# Patient Record
Sex: FEMALE | NOT HISPANIC OR LATINO | URBAN - METROPOLITAN AREA
[De-identification: names, ages, dates, MRNs, and addresses within clinical notes are randomized per-mention and may not be internally consistent; named-entity substitution may affect disease eponyms.]

---

## 2017-08-18 ENCOUNTER — IMPORTED ENCOUNTER (OUTPATIENT)
Dept: URBAN - METROPOLITAN AREA CLINIC 1 | Facility: CLINIC | Age: 62
End: 2017-08-18

## 2017-08-18 PROBLEM — H40.052: Noted: 2017-08-18

## 2017-08-18 PROBLEM — H16.143: Noted: 2017-08-18

## 2017-08-18 PROBLEM — H25.813: Noted: 2017-08-18

## 2017-08-18 PROBLEM — H04.123: Noted: 2017-08-18

## 2017-08-18 PROBLEM — H40.033: Noted: 2017-08-18

## 2017-08-18 PROCEDURE — 92012 INTRM OPH EXAM EST PATIENT: CPT

## 2017-08-18 NOTE — PATIENT DISCUSSION
1.  KRISTY w/ PEK OU- Increased symptoms despite using tears routinely. The increase of artificial tears OU to QID were recommended. Consider Restasis if no improvement. Placed Xsmall Silicone Plugs RLL and LLL:  Risks benefits and alternatives to placing plug was discussed with patient. Patient understands and would like to proceed. Consent was signed. Post op instructions were discussed/given to patient and patient was advised to call our office if any problems arose. 2.  Cataract OU: Observe for now without intervention. The patient was advised to contact us if any change or worsening of vision3. Narrow Angles OU- Patent Yag PI's OU4. OHTN OS (0.35)- Increased IOP OS today. Will re-eval in October w/ an OCT. Observe for changes/progression. Patient to continue with current treatment regimen. 5.   RTC as scheduled for a 30/OCT in October

## 2017-10-12 ENCOUNTER — IMPORTED ENCOUNTER (OUTPATIENT)
Dept: URBAN - METROPOLITAN AREA CLINIC 1 | Facility: CLINIC | Age: 62
End: 2017-10-12

## 2017-10-12 PROBLEM — H16.143: Noted: 2017-10-12

## 2017-10-12 PROBLEM — H25.813: Noted: 2017-10-12

## 2017-10-12 PROBLEM — H04.123: Noted: 2017-10-12

## 2017-10-12 PROBLEM — H40.033: Noted: 2017-10-12

## 2017-10-12 PROBLEM — H40.053: Noted: 2017-10-12

## 2017-10-12 PROCEDURE — 92133 CPTRZD OPH DX IMG PST SGM ON: CPT

## 2017-10-12 PROCEDURE — 92015 DETERMINE REFRACTIVE STATE: CPT

## 2017-10-12 PROCEDURE — 92014 COMPRE OPH EXAM EST PT 1/>: CPT

## 2018-04-12 ENCOUNTER — IMPORTED ENCOUNTER (OUTPATIENT)
Dept: URBAN - METROPOLITAN AREA CLINIC 1 | Facility: CLINIC | Age: 63
End: 2018-04-12

## 2018-04-12 PROBLEM — H04.123: Noted: 2018-04-12

## 2018-04-12 PROBLEM — H40.053: Noted: 2018-04-12

## 2018-04-12 PROBLEM — H16.143: Noted: 2018-04-12

## 2018-04-12 PROBLEM — H25.813: Noted: 2018-04-12

## 2018-04-12 PROBLEM — H40.033: Noted: 2018-04-12

## 2018-04-12 PROCEDURE — 99213 OFFICE O/P EST LOW 20 MIN: CPT

## 2018-04-12 NOTE — PATIENT DISCUSSION
1.  OHTN OU (0.3/0.45)- Stable IOP OU off drops. Observe for changes/progression. 2.  Cataract OU: Observe for now without intervention. The patient was advised to contact us if any change or worsening of vision3. KRISTY w/ PEK OU- Progressed OU but asymptomatic. Plugs out LLs OU. The continuation of artificial tears OU QID were recommended. Consider cautery LLs if no improvement. 4.  Narrow Angles OU- S/p PIs OU. 5. Return for an appointment for a 30/OCT in 6 months with Dr. Jeferson Banuelos.

## 2018-10-18 ENCOUNTER — IMPORTED ENCOUNTER (OUTPATIENT)
Dept: URBAN - METROPOLITAN AREA CLINIC 1 | Facility: CLINIC | Age: 63
End: 2018-10-18

## 2018-10-18 PROBLEM — H40.033: Noted: 2018-10-18

## 2018-10-18 PROBLEM — H25.813: Noted: 2018-10-18

## 2018-10-18 PROBLEM — H16.143: Noted: 2018-10-18

## 2018-10-18 PROBLEM — H04.123: Noted: 2018-10-18

## 2018-10-18 PROBLEM — H40.013: Noted: 2018-10-18

## 2018-10-18 PROBLEM — H40.053: Noted: 2018-10-18

## 2018-10-18 PROCEDURE — 92133 CPTRZD OPH DX IMG PST SGM ON: CPT

## 2018-10-18 PROCEDURE — 92014 COMPRE OPH EXAM EST PT 1/>: CPT

## 2018-10-18 PROCEDURE — 92015 DETERMINE REFRACTIVE STATE: CPT

## 2018-10-18 NOTE — PATIENT DISCUSSION
1.  OHTN (0.3/0.4)- Stable IOP OU. Normal OCT OU. Observe for changes/progression. Condition discussed w/ pt and reassurance given. 2.  KRISTY w/ PEK OU- improved. The continuation of artificial tears were recommended. H/o XS plugs fell out LLs OU. 3.  Cataract OU: Observe for now without intervention. The patient was advised to contact us if any change or worsening of vision4. Narrow Angles OU: S/p PIs OU. 5. Return for an appointment for 10/check K and IOP in 6 months with Dr. Carolee Bush.

## 2019-04-18 ENCOUNTER — IMPORTED ENCOUNTER (OUTPATIENT)
Dept: URBAN - METROPOLITAN AREA CLINIC 1 | Facility: CLINIC | Age: 64
End: 2019-04-18

## 2019-04-18 PROBLEM — H16.143: Noted: 2019-04-18

## 2019-04-18 PROBLEM — H04.123: Noted: 2019-04-18

## 2019-04-18 PROBLEM — H40.053: Noted: 2019-04-18

## 2019-04-18 PROCEDURE — 92012 INTRM OPH EXAM EST PATIENT: CPT

## 2019-04-18 NOTE — PATIENT DISCUSSION
1.  KRISTY w/ PEK OU-H/o Plugs LLs OU- plugs out RLL LLL. Cont ATs QID OU routinely. 2.  OHTN (0.3/0.4)- Stable IOP OU. Past w/u negative. Cont to observe on no gtts. **If IOP stable next visit will follow yearly. 3.  Cataract OU: Observe for now without intervention. The patient was advised to contact us if any change or worsening of vision4. Narrow Angles OU: S/p PIs OU. Return for an appointment in 6 mo 30 OCT with Dr. Jacklyn Blas.

## 2019-10-24 ENCOUNTER — IMPORTED ENCOUNTER (OUTPATIENT)
Dept: URBAN - METROPOLITAN AREA CLINIC 1 | Facility: CLINIC | Age: 64
End: 2019-10-24

## 2019-10-24 PROBLEM — H25.813: Noted: 2019-10-24

## 2019-10-24 PROBLEM — H40.053: Noted: 2019-10-24

## 2019-10-24 PROBLEM — H40.033: Noted: 2019-10-24

## 2019-10-24 PROBLEM — H04.123: Noted: 2019-10-24

## 2019-10-24 PROBLEM — H16.143: Noted: 2019-10-24

## 2019-10-24 PROCEDURE — 92015 DETERMINE REFRACTIVE STATE: CPT

## 2019-10-24 PROCEDURE — 92133 CPTRZD OPH DX IMG PST SGM ON: CPT

## 2019-10-24 PROCEDURE — 92014 COMPRE OPH EXAM EST PT 1/>: CPT

## 2019-10-24 NOTE — PATIENT DISCUSSION
1.  KRISTY w/ PEK OU- (H/o plugs LL OU puncta open OU) Cont ATs QID OU routinely. 2.  OHTN (CD 0.30/0.40) -Observe for changes/progression. Patient to continue with current treatment regimen. 3. Cataract OU: Observe for now without intervention. The patient was advised to contact us if any change or worsening of vision4. Narrow Angles OU - S/p PI's OU  MRX for glasses given. Return for an appointment in 1 year 30/glare with Dr. Robert Beard.

## 2020-10-15 ENCOUNTER — IMPORTED ENCOUNTER (OUTPATIENT)
Dept: URBAN - METROPOLITAN AREA CLINIC 1 | Facility: CLINIC | Age: 65
End: 2020-10-15

## 2020-10-15 PROBLEM — H25.813: Noted: 2020-10-15

## 2020-10-15 PROBLEM — H04.123: Noted: 2020-10-15

## 2020-10-15 PROBLEM — H16.143: Noted: 2020-10-15

## 2020-10-15 PROCEDURE — 92014 COMPRE OPH EXAM EST PT 1/>: CPT

## 2020-10-15 PROCEDURE — 92015 DETERMINE REFRACTIVE STATE: CPT

## 2020-10-15 NOTE — PATIENT DISCUSSION
1.  Cataract OU -- Observe for now without intervention. The patient was advised to contact us if any change or worsening of vision2. KRISTY w/ PEK OU -- (H/o plugs LL OU puncta open OU) Cont ATs QID OU routinely. 3.  OHTN (CD 0.30/0.40) -- IOP stable. Observe for changes/progression. Patient to continue with current treatment regimen. 4.  Narrow Angles OU -- S/p PI's OU. MRX for glasses given per patient request. Return for an appointment in 1 year 30/glare with Dr. Denise Galvez.

## 2021-10-14 ENCOUNTER — IMPORTED ENCOUNTER (OUTPATIENT)
Dept: URBAN - METROPOLITAN AREA CLINIC 1 | Facility: CLINIC | Age: 66
End: 2021-10-14

## 2021-10-14 PROBLEM — H43.813: Noted: 2021-10-14

## 2021-10-14 PROBLEM — H40.033: Noted: 2021-10-14

## 2021-10-14 PROBLEM — H16.143: Noted: 2021-10-14

## 2021-10-14 PROBLEM — H04.123: Noted: 2021-10-14

## 2021-10-14 PROBLEM — H25.813: Noted: 2021-10-14

## 2021-10-14 PROCEDURE — 92015 DETERMINE REFRACTIVE STATE: CPT

## 2021-10-14 PROCEDURE — 99214 OFFICE O/P EST MOD 30 MIN: CPT

## 2021-10-14 NOTE — PATIENT DISCUSSION
1.  Cataract OU -- Observe for now without intervention. The patient was advised to contact us if any change or worsening of vision2. KRISTY w/ PEK OU -- Increased PEK OU secondary to non compliance. Recommend the use of OTC ATs QID routinely. Compliance urged. 3.  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 4.  Narrow Angles OU -- Low Risk S/p PI's OU5. OHTN (CD 0.30/0.40) -- IOP stable. Observe no gtts. Return for an appointment in 1 year for a 30/glare with Dr. Siobhan Donnelly.

## 2022-04-02 ASSESSMENT — TONOMETRY
OS_IOP_MMHG: 20
OS_IOP_MMHG: 20
OD_IOP_MMHG: 20
OS_IOP_MMHG: 18
OS_IOP_MMHG: 18
OD_IOP_MMHG: 20
OS_IOP_MMHG: 20
OD_IOP_MMHG: 19
OD_IOP_MMHG: 20
OS_IOP_MMHG: 19
OD_IOP_MMHG: 18
OS_IOP_MMHG: 21
OD_IOP_MMHG: 17
OD_IOP_MMHG: 19
OD_IOP_MMHG: 19
OS_IOP_MMHG: 24

## 2022-04-02 ASSESSMENT — VISUAL ACUITY
OD_GLARE: 20/60
OD_GLARE: 20/40
OS_SC: 20/20
OD_CC: J1
OS_CC: J1
OD_SC: 20/20
OD_SC: 20/20
OS_CC: J1+
OD_SC: 20/20
OD_SC: 20/20
OD_CC: J1+-1
OD_SC: 20/20
OS_GLARE: 20/60
OS_SC: 20/25
OS_SC: 20/20
OD_GLARE: 20/50
OD_SC: 20/25+2
OS_SC: 20/20
OD_CC: J1+
OD_SC: 20/20
OS_SC: 20/20
OS_GLARE: 20/60
OS_GLARE: 20/50
OD_CC: J1+
OD_CC: J1
OD_SC: 20/25
OS_CC: J1
OD_CC: J1+
OD_GLARE: 20/60
OS_CC: J1+
OS_CC: J1+
OS_SC: 20/20
OS_GLARE: 20/40
OS_CC: J1+-1

## 2022-10-13 ENCOUNTER — COMPREHENSIVE EXAM (OUTPATIENT)
Dept: URBAN - METROPOLITAN AREA CLINIC 1 | Facility: CLINIC | Age: 67
End: 2022-10-13

## 2022-10-13 DIAGNOSIS — H25.813: ICD-10-CM

## 2022-10-13 DIAGNOSIS — H40.033: ICD-10-CM

## 2022-10-13 DIAGNOSIS — H16.143: ICD-10-CM

## 2022-10-13 DIAGNOSIS — H04.123: ICD-10-CM

## 2022-10-13 DIAGNOSIS — H43.813: ICD-10-CM

## 2022-10-13 PROCEDURE — 92015 DETERMINE REFRACTIVE STATE: CPT

## 2022-10-13 PROCEDURE — 92014 COMPRE OPH EXAM EST PT 1/>: CPT

## 2022-10-13 ASSESSMENT — TONOMETRY
OS_IOP_MMHG: 20
OD_IOP_MMHG: 20

## 2022-10-13 ASSESSMENT — VISUAL ACUITY
OS_BAT: 20/60
OD_CC: 20/25+2
OD_BAT: 20/60
OS_CC: 20/20-2
OU_CC: J1+

## 2022-10-13 NOTE — PATIENT DISCUSSION
1.  Cataract OU -- Observe for now without intervention. The patient was advised to contact us if any change or worsening of vision2. KRISTY w/ PEK OU -- Increased PEK OU secondary to non compliance. Recommend the use of OTC ATs QID routinely. Compliance urged. 3.  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 4.  Narrow Angles OU -- Low Risk S/p PI's OU5. OHTN (CD 0.30/0.40) -- IOP stable. Observe no gtts. Return for an appointment in 1 year for a 30/glare with Dr. Kavya Cervantes.

## 2022-10-13 NOTE — PATIENT DISCUSSION
Visually significant, however patient wishes to observe for now without intervention. The patient was advised to contact office with any change or worsening of vision. Glasses RX given today. Okay to call and schedule phaco pre-op H&P if patient desires.

## 2023-10-16 ENCOUNTER — COMPREHENSIVE EXAM (OUTPATIENT)
Dept: URBAN - METROPOLITAN AREA CLINIC 1 | Facility: CLINIC | Age: 68
End: 2023-10-16

## 2023-10-16 DIAGNOSIS — H43.813: ICD-10-CM

## 2023-10-16 DIAGNOSIS — H25.813: ICD-10-CM

## 2023-10-16 PROCEDURE — 92015 DETERMINE REFRACTIVE STATE: CPT

## 2023-10-16 PROCEDURE — 92014 COMPRE OPH EXAM EST PT 1/>: CPT

## 2023-10-16 ASSESSMENT — VISUAL ACUITY
OD_CC: 20/25
OS_CC: J1+
OD_CC: J1+
OS_BAT: 20/80
OD_BAT: 20/150
OS_CC: 20/25

## 2024-01-02 ENCOUNTER — PRE-OP/H&P (OUTPATIENT)
Dept: URBAN - METROPOLITAN AREA CLINIC 1 | Facility: CLINIC | Age: 69
End: 2024-01-02

## 2024-01-02 VITALS
DIASTOLIC BLOOD PRESSURE: 74 MMHG | BODY MASS INDEX: 23.78 KG/M2 | HEIGHT: 66 IN | SYSTOLIC BLOOD PRESSURE: 119 MMHG | HEART RATE: 94 BPM | WEIGHT: 148 LBS

## 2024-01-02 DIAGNOSIS — H25.813: ICD-10-CM

## 2024-01-02 PROCEDURE — 92025 CPTRIZED CORNEAL TOPOGRAPHY: CPT

## 2024-01-02 PROCEDURE — 99213 OFFICE O/P EST LOW 20 MIN: CPT

## 2024-01-02 PROCEDURE — 92136 OPHTHALMIC BIOMETRY: CPT

## 2024-01-02 ASSESSMENT — VISUAL ACUITY
OS_SC: 20/30-2
OD_SC: 20/25-2
OD_CC: 20/25
OD_CC: J1+
OS_CC: J1+
OS_CC: 20/25
OS_BAT: 20/80
OD_BAT: 20/150

## 2024-01-02 ASSESSMENT — TONOMETRY
OD_IOP_MMHG: 21
OS_IOP_MMHG: 21

## 2024-01-17 ENCOUNTER — SURGERY/PROCEDURE (OUTPATIENT)
Dept: URBAN - METROPOLITAN AREA SURGERY 2 | Facility: SURGERY | Age: 69
End: 2024-01-17

## 2024-01-17 DIAGNOSIS — H25.812: ICD-10-CM

## 2024-01-17 PROCEDURE — 66984AV REMOVE CATARACT, INSERT ADVANCED LENS

## 2024-01-17 PROCEDURE — 99199PAV PROF ADVANCED VISION PACKAGE

## 2024-01-18 ENCOUNTER — POST-OP (OUTPATIENT)
Dept: URBAN - METROPOLITAN AREA CLINIC 2 | Facility: CLINIC | Age: 69
End: 2024-01-18

## 2024-01-18 DIAGNOSIS — Z96.1: ICD-10-CM

## 2024-01-18 PROCEDURE — 99024 POSTOP FOLLOW-UP VISIT: CPT

## 2024-01-18 ASSESSMENT — VISUAL ACUITY: OS_SC: 20/20-1

## 2024-01-18 ASSESSMENT — TONOMETRY: OS_IOP_MMHG: 33

## 2024-02-06 ENCOUNTER — POST OP/EVAL OF SECOND EYE (OUTPATIENT)
Dept: URBAN - METROPOLITAN AREA CLINIC 1 | Facility: CLINIC | Age: 69
End: 2024-02-06

## 2024-02-06 VITALS
BODY MASS INDEX: 23.78 KG/M2 | HEIGHT: 66 IN | HEART RATE: 94 BPM | WEIGHT: 148 LBS | SYSTOLIC BLOOD PRESSURE: 119 MMHG | DIASTOLIC BLOOD PRESSURE: 74 MMHG

## 2024-02-06 DIAGNOSIS — Z96.1: ICD-10-CM

## 2024-02-06 DIAGNOSIS — H25.811: ICD-10-CM

## 2024-02-06 PROCEDURE — 92025 CPTRIZED CORNEAL TOPOGRAPHY: CPT

## 2024-02-06 PROCEDURE — 92136 OPHTHALMIC BIOMETRY: CPT

## 2024-02-06 ASSESSMENT — TONOMETRY
OD_IOP_MMHG: 19
OS_IOP_MMHG: 19

## 2024-02-06 ASSESSMENT — VISUAL ACUITY
OS_SC: 20/20-1
OD_BAT: 20/150
OD_SC: 20/25
OS_SC: J3

## 2024-02-21 ENCOUNTER — SURGERY/PROCEDURE (OUTPATIENT)
Dept: URBAN - METROPOLITAN AREA SURGERY 2 | Facility: SURGERY | Age: 69
End: 2024-02-21

## 2024-02-21 DIAGNOSIS — H25.811: ICD-10-CM

## 2024-02-21 PROCEDURE — 66984AV REMOVE CATARACT, INSERT ADVANCED LENS

## 2024-02-21 PROCEDURE — 99199PAV PROF ADVANCED VISION PACKAGE

## 2024-02-22 ENCOUNTER — POST-OP (OUTPATIENT)
Dept: URBAN - METROPOLITAN AREA CLINIC 2 | Facility: CLINIC | Age: 69
End: 2024-02-22

## 2024-02-22 DIAGNOSIS — Z96.1: ICD-10-CM

## 2024-02-22 PROCEDURE — 99024 POSTOP FOLLOW-UP VISIT: CPT

## 2024-02-22 ASSESSMENT — VISUAL ACUITY
OS_SC: J3
OS_SC: 20/20
OD_SC: 20/40
OD_PH: 20/25

## 2024-02-22 ASSESSMENT — TONOMETRY
OD_IOP_MMHG: 26
OS_IOP_MMHG: 15

## 2024-03-14 ENCOUNTER — POST-OP (OUTPATIENT)
Dept: URBAN - METROPOLITAN AREA CLINIC 1 | Facility: CLINIC | Age: 69
End: 2024-03-14

## 2024-03-14 DIAGNOSIS — Z96.1: ICD-10-CM

## 2024-03-14 ASSESSMENT — TONOMETRY
OS_IOP_MMHG: 14
OD_IOP_MMHG: 14

## 2024-03-14 ASSESSMENT — VISUAL ACUITY
OU_SC: J1
OD_SC: 20/20
OS_SC: 20/20

## 2024-10-14 ENCOUNTER — COMPREHENSIVE EXAM (OUTPATIENT)
Dept: URBAN - METROPOLITAN AREA CLINIC 1 | Facility: CLINIC | Age: 69
End: 2024-10-14

## 2024-10-14 DIAGNOSIS — H04.123: ICD-10-CM

## 2024-10-14 DIAGNOSIS — H26.493: ICD-10-CM

## 2024-10-14 DIAGNOSIS — H16.143: ICD-10-CM

## 2024-10-14 DIAGNOSIS — H43.813: ICD-10-CM

## 2024-10-14 DIAGNOSIS — Z96.1: ICD-10-CM

## 2024-10-14 PROCEDURE — 99214 OFFICE O/P EST MOD 30 MIN: CPT

## 2025-04-14 ENCOUNTER — FOLLOW UP (OUTPATIENT)
Age: 70
End: 2025-04-14

## 2025-04-14 DIAGNOSIS — H04.123: ICD-10-CM

## 2025-04-14 DIAGNOSIS — H26.493: ICD-10-CM

## 2025-04-14 DIAGNOSIS — Z96.1: ICD-10-CM

## 2025-04-14 PROCEDURE — 92015 DETERMINE REFRACTIVE STATE: CPT

## 2025-04-14 PROCEDURE — 99213 OFFICE O/P EST LOW 20 MIN: CPT

## 2025-04-25 ENCOUNTER — CLINIC PROCEDURE ONLY (OUTPATIENT)
Age: 70
End: 2025-04-25

## 2025-04-25 DIAGNOSIS — H26.493: ICD-10-CM

## 2025-04-25 DIAGNOSIS — Z96.1: ICD-10-CM

## 2025-04-25 PROCEDURE — 66821 AFTER CATARACT LASER SURGERY: CPT

## 2025-05-09 ENCOUNTER — CLINIC PROCEDURE ONLY (OUTPATIENT)
Age: 70
End: 2025-05-09

## 2025-05-09 DIAGNOSIS — H26.492: ICD-10-CM

## 2025-05-09 DIAGNOSIS — Z96.1: ICD-10-CM

## 2025-05-09 PROCEDURE — 66821 AFTER CATARACT LASER SURGERY: CPT | Mod: 79,LT

## 2025-06-02 ENCOUNTER — POST-OP (OUTPATIENT)
Age: 70
End: 2025-06-02

## 2025-06-02 DIAGNOSIS — Z98.890: ICD-10-CM
